# Patient Record
Sex: MALE | URBAN - METROPOLITAN AREA
[De-identification: names, ages, dates, MRNs, and addresses within clinical notes are randomized per-mention and may not be internally consistent; named-entity substitution may affect disease eponyms.]

---

## 2017-04-18 NOTE — PATIENT DISCUSSION
Capsular haze appears visually significant, RECOMMEND CONSULT with REKHAfor possible YAG capsulotomy.

## 2020-06-29 ENCOUNTER — IMPORTED ENCOUNTER (OUTPATIENT)
Dept: URBAN - METROPOLITAN AREA CLINIC 9 | Facility: CLINIC | Age: 60
End: 2020-06-29

## 2020-07-31 ENCOUNTER — IMPORTED ENCOUNTER (OUTPATIENT)
Dept: URBAN - METROPOLITAN AREA CLINIC 9 | Facility: CLINIC | Age: 60
End: 2020-07-31

## 2020-08-14 ENCOUNTER — IMPORTED ENCOUNTER (OUTPATIENT)
Dept: URBAN - METROPOLITAN AREA CLINIC 9 | Facility: CLINIC | Age: 60
End: 2020-08-14

## 2020-09-02 ENCOUNTER — IMPORTED ENCOUNTER (OUTPATIENT)
Dept: URBAN - METROPOLITAN AREA CLINIC 9 | Facility: CLINIC | Age: 60
End: 2020-09-02

## 2021-10-16 ASSESSMENT — VISUAL ACUITY
OS_SC: 20/400 SN
OD_CC: 20/60 SN
OD_SC: 20/400 SN
OS_SC: 20/20 SN
OS_CC: 20/40 SN
OD_SC: 20/25 - SN
OD_SC: 20/20 SN
OD_CC: 20/20 - SN

## 2021-10-16 ASSESSMENT — KERATOMETRY
OD_AXISANGLE2_DEGREES: 109
OD_AXISANGLE2_DEGREES: 75
OD_AXISANGLE_DEGREES: 165
OD_K2POWER_DIOPTERS: 43
OS_AXISANGLE2_DEGREES: 22
OD_AXISANGLE_DEGREES: 19
OD_K1POWER_DIOPTERS: 42.75
OS_K1POWER_DIOPTERS: 42.25
OS_K2POWER_DIOPTERS: 43.25
OD_K2POWER_DIOPTERS: 43.5
OS_AXISANGLE_DEGREES: 112
OD_K1POWER_DIOPTERS: 42.75

## 2021-10-16 ASSESSMENT — TONOMETRY
OS_IOP_MMHG: 18
OD_IOP_MMHG: 18
OD_IOP_MMHG: 18
OD_IOP_MMHG: 16
OS_IOP_MMHG: 18

## 2022-01-14 NOTE — PATIENT DISCUSSION
Reviewed that they tend to [address] over a few weeks, and then may not happen for many years. Patient instructed to return if they do not clear over a few weeks for further evaluation.

## 2023-01-17 NOTE — PATIENT DISCUSSION
Reviewed that they tend to [address] over a few weeks, and then may not happen for many years. Patient instructed to return if they do not clear over a few weeks for further evaluation.

## 2023-01-30 NOTE — PATIENT DISCUSSION
Discussed the importance of blood pressure control in the prevention of ocular complications. NON-SMOKER.